# Patient Record
Sex: FEMALE | Race: BLACK OR AFRICAN AMERICAN | NOT HISPANIC OR LATINO | Employment: UNEMPLOYED | URBAN - METROPOLITAN AREA
[De-identification: names, ages, dates, MRNs, and addresses within clinical notes are randomized per-mention and may not be internally consistent; named-entity substitution may affect disease eponyms.]

---

## 2023-06-02 ENCOUNTER — HOSPITAL ENCOUNTER (OUTPATIENT)
Dept: DATA CONVERSION | Facility: HOSPITAL | Age: 23
End: 2023-06-02
Attending: EMERGENCY MEDICINE | Admitting: STUDENT IN AN ORGANIZED HEALTH CARE EDUCATION/TRAINING PROGRAM
Payer: COMMERCIAL

## 2023-06-02 DIAGNOSIS — R11.0 NAUSEA: ICD-10-CM

## 2023-06-02 DIAGNOSIS — R10.11 RIGHT UPPER QUADRANT PAIN: ICD-10-CM

## 2023-06-02 DIAGNOSIS — Z3A.01 LESS THAN 8 WEEKS GESTATION OF PREGNANCY (HHS-HCC): ICD-10-CM

## 2023-06-02 DIAGNOSIS — J45.909 UNSPECIFIED ASTHMA, UNCOMPLICATED (HHS-HCC): ICD-10-CM

## 2023-06-02 DIAGNOSIS — O00.90 UNSPECIFIED ECTOPIC PREGNANCY WITHOUT INTRAUTERINE PREGNANCY (HHS-HCC): ICD-10-CM

## 2023-06-02 DIAGNOSIS — R10.31 RIGHT LOWER QUADRANT PAIN: ICD-10-CM

## 2023-06-02 LAB
ABO GROUP (TYPE) IN BLOOD: NORMAL
ABO GROUP (TYPE) IN BLOOD: NORMAL
ACTIVATED PARTIAL THROMBOPLASTIN TIME IN PPP BY COAGULATION ASSAY: 26 SEC (ref 26–39)
ALANINE AMINOTRANSFERASE (SGPT) (U/L) IN SER/PLAS: 14 U/L (ref 7–45)
ALBUMIN (G/DL) IN SER/PLAS: 3.8 G/DL (ref 3.4–5)
ALKALINE PHOSPHATASE (U/L) IN SER/PLAS: 65 U/L (ref 33–110)
ANION GAP IN SER/PLAS: 13 MMOL/L (ref 10–20)
ANTIBODY SCREEN: NORMAL
ANTIBODY SCREEN: NORMAL
ASPARTATE AMINOTRANSFERASE (SGOT) (U/L) IN SER/PLAS: 13 U/L (ref 9–39)
BASOPHILS (10*3/UL) IN BLOOD BY AUTOMATED COUNT: 0.01 X10E9/L (ref 0–0.1)
BASOPHILS/100 LEUKOCYTES IN BLOOD BY AUTOMATED COUNT: 0.1 % (ref 0–2)
BILIRUBIN TOTAL (MG/DL) IN SER/PLAS: 0.4 MG/DL (ref 0–1.2)
CALCIUM (MG/DL) IN SER/PLAS: 9 MG/DL (ref 8.6–10.6)
CARBON DIOXIDE, TOTAL (MMOL/L) IN SER/PLAS: 23 MMOL/L (ref 21–32)
CHLORIDE (MMOL/L) IN SER/PLAS: 105 MMOL/L (ref 98–107)
CHORIOGONADOTROPIN (MIU/ML) IN SER/PLAS: ABNORMAL IU/L
CREATININE (MG/DL) IN SER/PLAS: 0.5 MG/DL (ref 0.5–1.05)
EOSINOPHILS (10*3/UL) IN BLOOD BY AUTOMATED COUNT: 0 X10E9/L (ref 0–0.7)
EOSINOPHILS/100 LEUKOCYTES IN BLOOD BY AUTOMATED COUNT: 0 % (ref 0–6)
ERYTHROCYTE DISTRIBUTION WIDTH (RATIO) BY AUTOMATED COUNT: 14 % (ref 11.5–14.5)
ERYTHROCYTE MEAN CORPUSCULAR HEMOGLOBIN CONCENTRATION (G/DL) BY AUTOMATED: 34.6 G/DL (ref 32–36)
ERYTHROCYTE MEAN CORPUSCULAR VOLUME (FL) BY AUTOMATED COUNT: 84 FL (ref 80–100)
ERYTHROCYTES (10*6/UL) IN BLOOD BY AUTOMATED COUNT: 3.39 X10E12/L (ref 4–5.2)
GFR FEMALE: >90 ML/MIN/1.73M2
GLUCOSE (MG/DL) IN SER/PLAS: 106 MG/DL (ref 74–99)
HEMATOCRIT (%) IN BLOOD BY AUTOMATED COUNT: 28.6 % (ref 36–46)
HEMOGLOBIN (G/DL) IN BLOOD: 9.9 G/DL (ref 12–16)
IMMATURE GRANULOCYTES/100 LEUKOCYTES IN BLOOD BY AUTOMATED COUNT: 0.7 % (ref 0–0.9)
INR IN PPP BY COAGULATION ASSAY: 1.1 (ref 0.9–1.1)
LEUKOCYTES (10*3/UL) IN BLOOD BY AUTOMATED COUNT: 11.2 X10E9/L (ref 4.4–11.3)
LYMPHOCYTES (10*3/UL) IN BLOOD BY AUTOMATED COUNT: 1.04 X10E9/L (ref 1.2–4.8)
LYMPHOCYTES/100 LEUKOCYTES IN BLOOD BY AUTOMATED COUNT: 9.3 % (ref 13–44)
MONOCYTES (10*3/UL) IN BLOOD BY AUTOMATED COUNT: 0.42 X10E9/L (ref 0.1–1)
MONOCYTES/100 LEUKOCYTES IN BLOOD BY AUTOMATED COUNT: 3.8 % (ref 2–10)
NEUTROPHILS (10*3/UL) IN BLOOD BY AUTOMATED COUNT: 9.64 X10E9/L (ref 1.2–7.7)
NEUTROPHILS/100 LEUKOCYTES IN BLOOD BY AUTOMATED COUNT: 86.1 % (ref 40–80)
NRBC (PER 100 WBCS) BY AUTOMATED COUNT: 0 /100 WBC (ref 0–0)
PLATELETS (10*3/UL) IN BLOOD AUTOMATED COUNT: 268 X10E9/L (ref 150–450)
POTASSIUM (MMOL/L) IN SER/PLAS: 3.3 MMOL/L (ref 3.5–5.3)
PROTEIN TOTAL: 6.9 G/DL (ref 6.4–8.2)
PROTHROMBIN TIME (PT) IN PPP BY COAGULATION ASSAY: 12.5 SEC (ref 9.8–13.4)
RH FACTOR: NORMAL
RH FACTOR: NORMAL
SODIUM (MMOL/L) IN SER/PLAS: 138 MMOL/L (ref 136–145)
UREA NITROGEN (MG/DL) IN SER/PLAS: 7 MG/DL (ref 6–23)

## 2023-06-05 LAB
COMPLETE PATHOLOGY REPORT: NORMAL
CONVERTED CLINICAL DIAGNOSIS-HISTORY: NORMAL
CONVERTED FINAL DIAGNOSIS: NORMAL
CONVERTED FINAL REPORT PDF LINK TO COPY AND PASTE: NORMAL
CONVERTED GROSS DESCRIPTION: NORMAL

## 2023-07-18 ENCOUNTER — APPOINTMENT (OUTPATIENT)
Dept: PRIMARY CARE | Facility: CLINIC | Age: 23
End: 2023-07-18
Payer: COMMERCIAL

## 2023-07-19 ENCOUNTER — TELEPHONE (OUTPATIENT)
Dept: PRIMARY CARE | Facility: CLINIC | Age: 23
End: 2023-07-19
Payer: COMMERCIAL

## 2023-07-19 NOTE — TELEPHONE ENCOUNTER
Patient was a no show yesterday for Олег and was RS 7/24/23 with Dr Bales ,  this was cancelled ad she wants to R/S.  I am not sure of status can you please call her>

## 2023-07-24 ENCOUNTER — APPOINTMENT (OUTPATIENT)
Dept: PRIMARY CARE | Facility: CLINIC | Age: 23
End: 2023-07-24
Payer: COMMERCIAL

## 2023-08-03 PROBLEM — J45.909 ASTHMA (HHS-HCC): Status: ACTIVE | Noted: 2023-08-03

## 2023-08-03 PROBLEM — F90.2 ATTENTION DEFICIT HYPERACTIVITY DISORDER (ADHD), COMBINED TYPE: Status: ACTIVE | Noted: 2017-02-07

## 2023-08-03 PROBLEM — A59.01 TRICHOMONAL VAGINITIS: Status: RESOLVED | Noted: 2017-09-28 | Resolved: 2023-08-03

## 2023-08-03 PROBLEM — E66.9 CLASS 1 OBESITY: Status: ACTIVE | Noted: 2023-08-03

## 2023-08-03 PROBLEM — G47.9 SLEEP DISTURBANCE: Status: ACTIVE | Noted: 2017-06-02

## 2023-08-03 PROBLEM — E66.811 CLASS 1 OBESITY: Status: ACTIVE | Noted: 2023-08-03

## 2023-08-03 PROBLEM — K08.9 CHRONIC DENTAL PAIN: Status: ACTIVE | Noted: 2017-09-26

## 2023-08-03 PROBLEM — F31.9 BIPOLAR AND RELATED DISORDER (MULTI): Status: ACTIVE | Noted: 2017-02-07

## 2023-08-03 PROBLEM — F32.A DEPRESSION: Status: ACTIVE | Noted: 2017-02-07

## 2023-08-03 PROBLEM — G89.29 CHRONIC DENTAL PAIN: Status: ACTIVE | Noted: 2017-09-26

## 2023-08-03 PROBLEM — L85.3 DRY SKIN DERMATITIS: Status: ACTIVE | Noted: 2017-05-12

## 2023-08-03 PROBLEM — L29.9 ITCHY SKIN: Status: RESOLVED | Noted: 2023-08-03 | Resolved: 2023-08-03

## 2023-08-03 PROBLEM — D56.3: Status: ACTIVE | Noted: 2023-08-03

## 2023-08-03 RX ORDER — DIAPER,BRIEF,INFANT-TODD,DISP
EACH MISCELLANEOUS
COMMUNITY
Start: 2018-10-26

## 2023-08-03 RX ORDER — LORATADINE 10 MG/1
1 TABLET ORAL DAILY
COMMUNITY
Start: 2018-10-26

## 2023-08-03 RX ORDER — ACETAMINOPHEN 500 MG
TABLET ORAL
COMMUNITY
Start: 2018-08-31

## 2023-08-03 RX ORDER — IBUPROFEN 600 MG/1
600 TABLET ORAL EVERY 6 HOURS PRN
COMMUNITY

## 2023-08-04 ENCOUNTER — APPOINTMENT (OUTPATIENT)
Dept: PRIMARY CARE | Facility: CLINIC | Age: 23
End: 2023-08-04
Payer: COMMERCIAL

## 2023-09-21 ENCOUNTER — APPOINTMENT (OUTPATIENT)
Dept: PRIMARY CARE | Facility: CLINIC | Age: 23
End: 2023-09-21
Payer: COMMERCIAL

## 2023-09-30 NOTE — H&P
History of Present Illness:   Pregnant/Lactating:  ·  Are You Pregnant yes (1)   ·  Are You Currently Breastfeeding no (1)     History Present Illness:  Reason for surgery: ectopic pregnancy   HPI:    23 yo  at 8-9wga by LMP presents from  with concern for ruptured ectopic. She reports she has been having right sided abdominal pain for 2-3 weeks, which has worsened  over the last 3-4 days. The pain is crampy and constant. She denies fevers/chills, N/V, CP, SOB.     ED course:  - hgb 9.9  - hcg 84371    Obhx: P1  - 2018  c/b PPH; 6#5 F  - IAB x1  GYN: h/o CT  PMHx: asthma  Meds: PNV, albuterol  NKDA  PSHx: D&C x1  SHx: t/a/d      Allergies:        Allergies:  ·  No Known Allergies :     Home Medication Review:   Home Medications Reviewed: yes     Impression/Procedure:   ·  Impression and Planned Procedure: dx lap, removal of ectopic pregnancy, possible salpingectomy       ERAS (Enhanced Recovery After Surgery):  ·  ERAS Patient: yes   ·  CPM/PAT Utilization: no   ·  Immunonutrition Recovery Drink Utilization: no   ·  Carbohydrate Supplement Drink Utilization: no       Vital Signs:  Temperature C: 36.2 degrees C   Temperature F: 97.1 degrees F   Heart Rate: 92 beats per minute   Respiratory Rate: 16 breath per minute   Blood Pressure Systolic: 138 mm/Hg   Blood Pressure Diastolic: 84 mm/Hg     Physical Exam by System:    Constitutional: awake and alert, NAD   Eyes: clear sclera   ENMT: MMM   Respiratory/Thorax: no respiratory distress   Cardiovascular: Regular rate   Gastrointestinal: Soft, non-distended, slightly tender  to palpation in RLQ, no rebound/guarding   Genitourinary: def   Extremities: normal extremities, no cyanosis or edema   Neurological: alert and oriented x3   Psychological: Appropriate mood and behavior   Skin: Warm and dry     Consent:   COVID-19 Consent:  ·  COVID-19 Risk Consent Surgeon has reviewed key risks related to the risk of constance COVID-19 and if they contract  "COVID-19 what the risks are.     Attestation:   Note Completion:  I am a:  Resident/Fellow   Attending Attestation I saw and evaluated the patient.  I personally obtained the key and critical portions of the history and physical exam or was physically present for key and  critical portions performed by the resident/fellow. I reviewed the resident/fellow?s documentation and discussed the patient with the resident/fellow.  I agree with the resident/fellow?s medical decision making as documented in their note  with the exception/addition of the following:    I personally evaluated the patient on 02-Jun-2023   Comments/ Additional Findings    Ultrasound shows right adnexal ectopic pregnancy and large amount of free fluid.   Patient to go to OR for likely ruptured ectopic.           Electronic Signatures:  Lindsay Jiang (MD)  (Signed 02-Jun-2023 17:17)   Authored: Note Completion  Chayo Solares (Resident))  (Signed 02-Jun-2023 16:49)   Authored: History of Present Illness, Allergies, Home  Medication Review, Impression/Procedure, ERAS, Physical Exam, Consent, Note Completion      Last Updated: 02-Jun-2023 17:17 by Lindsay Jiang)    References:  1.  Data Referenced From \"Triage - ED\" 02-Jun-2023 13:55   "

## 2023-10-02 NOTE — OP NOTE
Post Operative Note:     PreOp Diagnosis: ectopic pregnancy   Post-Procedure Diagnosis: ruptured right tubal ectopic  pregnancy, hemoperitoneum   Procedure: 1. diagnostic laparoscopy  2. right salpingectomy  3. evacuation of hemoperitoneum   Surgeon: Dr Rashid Wells   Resident/Fellow/Other Assistant: Dr Ivy England   Anesthesia: GETA   Estimated Blood Loss (mL): 50   Specimen: yes. right fallopian tube, ectopic pregnancy   Complications: none   Findings: 1500cc hemoperitoneum, actively bleeding  ruptured right fallopian tube with gestational sac and approx 9wk fetus   Patient Returned To/Condition: PACU/good   Urine Output: 150cc     Operative Report Dictated:  Dictation: not applicable - note contains Operative  Report   Operative Report:    Findings: Upon abdominal entry, significant hemoperitoneum noted. Right fallopian tube ruptured with obvious  gestational sac in the tube. Briskly active bleeding was noted from the isthmal region of the tube. A normal R ovary, uterus, and contralateral fallopian tube & ovary were noted.     Procedure:  Patient was taken to the operating room where a pre-procedure time-out was performed and general anesthesia was administered. Patient was placed in dorsal lithotomy position with legs in sequential compression devices and Derrek stirrups. Right and left  arms were tucked along her sides. The patient was prepped and draped in the usual sterile fashion, after which a Osuna catheter was placed.  An incision was made at the base of the umbilicus with the scalpel approximately 1 cm in length.  This was developed  bluntly down to the underlying fascia. The fascia was elevated with small Kocher clamps and nicked with a knife. The underlying peritoneum was tented, nicked with the knife, and the peritoneal cavity entry site noted to be free of adhesions. A 10 mm balloon  port was placed and a pneumoperitoneum was assessed. The pelvis was easily visualized with findings as noted  above. It was possible to place two 5 mm port sites bilaterally in the right and left lower abdomen under direct visualization.  She was placed  into Trendelenburg position. A LigaSure device was used along the right mesosalpinx to dissect toward the cornua of the uterus. Tube and associated products of conception were then transected and removed via EndoCatch bag. The specimen was handed off  for pathology.  The bleeding portion of the mesosalpinx was cauterized with the LigaSure and hemostasis was achieved. Hemoperitoneum was evacuated. Hemostasis was again noted. The 5 mm port sites were removed from the abdomen. Pneumoperitoneum was desufflated.  The midline port site was removed, and the fascia closed with 0 Vicryl suture. The skin was closed with 3-0 Moncryl suture. Osuna catheter was removed. She was awakened from anesthesia and transferred to the recovery room in satisfactory condition.       Ivy England MD MPH (PGY-3)  Gynecology    Attestation:   Note Completion:  I am a: Resident/Fellow   Attending Attestation I was present for the entire procedure          Electronic Signatures:  Rashid Wells (MD (Fellow))  (Signed 04-Jun-2023 10:51)   Authored: Note Completion   Co-Signer: Post Operative Note, Note Completion  Ivy England (MD (Resident))  (Signed 02-Jun-2023 20:00)   Authored: Post Operative Note, Note Completion      Last Updated: 04-Jun-2023 10:51 by Rashid Wells (MD (Fellow))

## 2023-11-08 ENCOUNTER — HOSPITAL ENCOUNTER (EMERGENCY)
Facility: HOSPITAL | Age: 23
Discharge: HOME | End: 2023-11-08
Payer: COMMERCIAL

## 2023-11-08 ENCOUNTER — PHARMACY VISIT (OUTPATIENT)
Dept: PHARMACY | Facility: CLINIC | Age: 23
End: 2023-11-08
Payer: MEDICAID

## 2023-11-08 VITALS
DIASTOLIC BLOOD PRESSURE: 79 MMHG | HEART RATE: 87 BPM | HEIGHT: 64 IN | WEIGHT: 200 LBS | RESPIRATION RATE: 20 BRPM | BODY MASS INDEX: 34.15 KG/M2 | TEMPERATURE: 97.8 F | OXYGEN SATURATION: 99 % | SYSTOLIC BLOOD PRESSURE: 124 MMHG

## 2023-11-08 DIAGNOSIS — A59.01 TRICHOMONAS VAGINITIS: Primary | ICD-10-CM

## 2023-11-08 LAB
AMORPH CRY #/AREA UR COMP ASSIST: ABNORMAL /HPF
APPEARANCE UR: ABNORMAL
BACTERIA #/AREA URNS AUTO: ABNORMAL /HPF
BILIRUB UR STRIP.AUTO-MCNC: NEGATIVE MG/DL
CLUE CELLS SPEC QL WET PREP: ABNORMAL
COLOR UR: ABNORMAL
GLUCOSE UR STRIP.AUTO-MCNC: NEGATIVE MG/DL
HCG UR QL IA.RAPID: NEGATIVE
HOLD SPECIMEN: NORMAL
KETONES UR STRIP.AUTO-MCNC: ABNORMAL MG/DL
LEUKOCYTE ESTERASE UR QL STRIP.AUTO: ABNORMAL
NITRITE UR QL STRIP.AUTO: NEGATIVE
PH UR STRIP.AUTO: 5 [PH]
PROT UR STRIP.AUTO-MCNC: ABNORMAL MG/DL
RBC # UR STRIP.AUTO: NEGATIVE /UL
RBC #/AREA URNS AUTO: ABNORMAL /HPF
SP GR UR STRIP.AUTO: 1.03
T VAGINALIS SPEC QL WET PREP: PRESENT
UROBILINOGEN UR STRIP.AUTO-MCNC: 2 MG/DL
WBC #/AREA URNS AUTO: ABNORMAL /HPF
WBC VAG QL WET PREP: ABNORMAL
YEAST VAG QL WET PREP: ABNORMAL

## 2023-11-08 PROCEDURE — 99285 EMERGENCY DEPT VISIT HI MDM: CPT

## 2023-11-08 PROCEDURE — 99283 EMERGENCY DEPT VISIT LOW MDM: CPT

## 2023-11-08 PROCEDURE — 87086 URINE CULTURE/COLONY COUNT: CPT | Mod: PORLAB | Performed by: NURSE PRACTITIONER

## 2023-11-08 PROCEDURE — 87800 DETECT AGNT MULT DNA DIREC: CPT | Mod: PORLAB | Performed by: NURSE PRACTITIONER

## 2023-11-08 PROCEDURE — 81025 URINE PREGNANCY TEST: CPT | Performed by: NURSE PRACTITIONER

## 2023-11-08 PROCEDURE — 81001 URINALYSIS AUTO W/SCOPE: CPT | Performed by: NURSE PRACTITIONER

## 2023-11-08 PROCEDURE — 87210 SMEAR WET MOUNT SALINE/INK: CPT | Performed by: NURSE PRACTITIONER

## 2023-11-08 PROCEDURE — RXMED WILLOW AMBULATORY MEDICATION CHARGE

## 2023-11-08 RX ORDER — METRONIDAZOLE 500 MG/1
500 TABLET ORAL 2 TIMES DAILY
Qty: 14 TABLET | Refills: 0 | Status: SHIPPED | OUTPATIENT
Start: 2023-11-08 | End: 2023-11-15

## 2023-11-08 ASSESSMENT — COLUMBIA-SUICIDE SEVERITY RATING SCALE - C-SSRS
1. IN THE PAST MONTH, HAVE YOU WISHED YOU WERE DEAD OR WISHED YOU COULD GO TO SLEEP AND NOT WAKE UP?: NO
6. HAVE YOU EVER DONE ANYTHING, STARTED TO DO ANYTHING, OR PREPARED TO DO ANYTHING TO END YOUR LIFE?: NO
2. HAVE YOU ACTUALLY HAD ANY THOUGHTS OF KILLING YOURSELF?: NO

## 2023-11-08 ASSESSMENT — PAIN SCALES - GENERAL
PAINLEVEL_OUTOF10: 0 - NO PAIN
PAINLEVEL_OUTOF10: 0 - NO PAIN

## 2023-11-08 ASSESSMENT — LIFESTYLE VARIABLES
REASON UNABLE TO ASSESS: NO
EVER FELT BAD OR GUILTY ABOUT YOUR DRINKING: NO
EVER HAD A DRINK FIRST THING IN THE MORNING TO STEADY YOUR NERVES TO GET RID OF A HANGOVER: NO
HAVE PEOPLE ANNOYED YOU BY CRITICIZING YOUR DRINKING: NO
HAVE YOU EVER FELT YOU SHOULD CUT DOWN ON YOUR DRINKING: NO

## 2023-11-08 ASSESSMENT — PAIN - FUNCTIONAL ASSESSMENT
PAIN_FUNCTIONAL_ASSESSMENT: 0-10
PAIN_FUNCTIONAL_ASSESSMENT: 0-10

## 2023-11-08 NOTE — PROGRESS NOTES
Medication Education     Medication education for Nguyen Nichols was provided to the patient  for the following medication(s):    Metronidazole 500 mg by mouth twice daily for 7 days    Medication education provided by a Pharmacist:  ADR Counseling Dose, frequency, storage    Identified potential barriers to education:  None    Method(s) of Education:  Verbal    An opportunity to ask questions and receive answers was provided.     Assessment of understanding the patient :  2= meets goals/outcomes    Additional Notes: Patient reported that last time she was here for STD treatment, she broke out in hives but wasn't sure what antibiotic caused it. I did confirm that she did not have any shortness of breath, throat swelling, and chest pain. Appears she has tolerated this before.    Rosalba Leonard   PharmD Candidate 2024

## 2023-11-08 NOTE — ED PROVIDER NOTES
HPI   Chief Complaint   Patient presents with    Vaginal Discharge    Vaginal Itching    Abscess       This is a 22-year-old -American female presenting to the emergency room with complaints of vaginal itching and discharge.  It has been going on for the last 3 to 4 days.  The patient denies any foul odor.  She denies any dyspareunia.  She reports she is having discomfort with urination.  She maintains that she has had the same sexual partner who has been tested.  She has had bacterial vaginosis in the past.  She denies any concern for STD.  She is not having any abdominal or back pain.  She denies any fevers, chills, nausea, or vomiting.                          No data recorded                Patient History   Past Medical History:   Diagnosis Date    Abnormal tooth eruption 11/18/2016    Encounter for supervision of normal first pregnancy, third trimester 11/12/2018    First pregnancy, third trimester    Inadequate pain control 11/18/2016    Obesity 06/13/2015    Pruritus, unspecified 10/26/2018    Itchy skin    Trichomonal vaginitis 09/28/2017     No past surgical history on file.  No family history on file.  Social History     Tobacco Use    Smoking status: Not on file    Smokeless tobacco: Not on file   Substance Use Topics    Alcohol use: Not on file    Drug use: Not on file       Physical Exam   ED Triage Vitals [11/08/23 1206]   Temp Heart Rate Resp BP   36.6 °C (97.8 °F) 87 20 124/79      SpO2 Temp src Heart Rate Source Patient Position   99 % -- -- --      BP Location FiO2 (%)     -- --       Physical Exam  Vitals and nursing note reviewed.   Constitutional:       Appearance: Normal appearance.   HENT:      Head: Normocephalic and atraumatic.      Right Ear: External ear normal.      Left Ear: External ear normal.      Nose: Nose normal.      Mouth/Throat:      Pharynx: Oropharynx is clear.   Eyes:      Conjunctiva/sclera: Conjunctivae normal.      Pupils: Pupils are equal, round, and reactive to  light.   Cardiovascular:      Rate and Rhythm: Normal rate and regular rhythm.   Pulmonary:      Effort: Pulmonary effort is normal.      Breath sounds: Normal breath sounds.   Abdominal:      General: Bowel sounds are normal.      Palpations: Abdomen is soft.   Genitourinary:     Comments: The patient has multiple scars consistent with hidradenitis suppurativa noted to the bilateral inner thighs.  The patient has a scant amount of thin white discharge noted to the vaginal vault.  The cervix appears normal.  There is no cervical wall or adnexal tenderness with palpation.  Musculoskeletal:         General: Normal range of motion.   Skin:     General: Skin is warm and dry.   Neurological:      General: No focal deficit present.      Mental Status: She is alert.   Psychiatric:         Mood and Affect: Mood normal.         Behavior: Behavior normal.         ED Course & MDM   Diagnoses as of 11/08/23 1501   Trichomonas vaginitis       Medical Decision Making  Patient was seen and evaluated by the nurse practitioner, Gale Wagner.  The patient is presenting to the emergency room with concerns for vaginal discharge and itching.  Differential diagnosis includes candidiasis, bacterial vaginosis, or STD.  Urine sample was obtained was negative for acute infection.  A pelvic exam was performed and the examination was largely unremarkable except thin white discharge noted in the vaginal vault.  Wet prep and GC and Chlamydia testing was performed.  GC and Chlamydia testing results are pending at the time of dictation.  Her wet prep was positive for trichomonas.  The patient was notified of her laboratory results.  The patient is to be prescribed metronidazole for antibiotic coverage.  She is to refrain from sexual activity until cleared and retested by follow-up physician after completion of her antibiotics.  It was also advised that her partner be tested as well.  The patient was discharged in stable condition with computer  discharge instructions given.  She is to return if worse in any way.        Procedure  Procedures     Gale Wagner, APRN-CNP  11/08/23 3951

## 2023-11-09 LAB
C TRACH RRNA SPEC QL NAA+PROBE: POSITIVE
N GONORRHOEA DNA SPEC QL PROBE+SIG AMP: NEGATIVE

## 2023-11-10 ENCOUNTER — TELEPHONE (OUTPATIENT)
Dept: PHARMACY | Facility: HOSPITAL | Age: 23
End: 2023-11-10
Payer: COMMERCIAL

## 2023-11-10 DIAGNOSIS — A74.9 CHLAMYDIA: Primary | ICD-10-CM

## 2023-11-10 LAB — BACTERIA UR CULT: NORMAL

## 2023-11-10 RX ORDER — DOXYCYCLINE 100 MG/1
100 CAPSULE ORAL 2 TIMES DAILY
Qty: 14 CAPSULE | Refills: 0 | Status: SHIPPED | OUTPATIENT
Start: 2023-11-10 | End: 2023-11-17

## 2023-11-10 NOTE — PROGRESS NOTES
EDPD Note: Bug-Drug Mismatch    Contacted Ms. Nguyen Nichols regarding a positive chlamydia result that was taken during their recent emergency room visit. I completed education with patient. The patient is not being treated. She is aware of the trichomonas and is currently taking metronidazole 500mg BID x 7 days. She is aware to take the antibiotic below and to continue the Flagyl.    The following prescription was sent to the patient's preferred pharmacy. No further follow up needed from EDPD Team.   Drug: doxycycline 100mg  Si tab PO BID  Days Supply: 7 days     Latest Reference Range & Units 23 13:02   Chlamydia trachomatis, Amplified Negative  Positive !   !: Data is abnormal   Latest Reference Range & Units 23 13:02   Trichomonas None Seen  Present !   !: Data is abnormal    Jemima Lara, PharmD

## 2024-07-30 ENCOUNTER — HOSPITAL ENCOUNTER (EMERGENCY)
Facility: HOSPITAL | Age: 24
Discharge: HOME | End: 2024-07-30
Attending: STUDENT IN AN ORGANIZED HEALTH CARE EDUCATION/TRAINING PROGRAM
Payer: COMMERCIAL

## 2024-07-30 ENCOUNTER — APPOINTMENT (OUTPATIENT)
Dept: RADIOLOGY | Facility: HOSPITAL | Age: 24
End: 2024-07-30
Payer: COMMERCIAL

## 2024-07-30 VITALS
WEIGHT: 202 LBS | HEART RATE: 71 BPM | SYSTOLIC BLOOD PRESSURE: 139 MMHG | TEMPERATURE: 98.1 F | DIASTOLIC BLOOD PRESSURE: 89 MMHG | HEIGHT: 63 IN | OXYGEN SATURATION: 99 % | BODY MASS INDEX: 35.79 KG/M2 | RESPIRATION RATE: 16 BRPM

## 2024-07-30 DIAGNOSIS — N93.9 VAGINAL BLEEDING: Primary | ICD-10-CM

## 2024-07-30 DIAGNOSIS — O36.80X0 PREGNANCY OF UNKNOWN ANATOMIC LOCATION (HHS-HCC): ICD-10-CM

## 2024-07-30 LAB
ALBUMIN SERPL BCP-MCNC: 4.1 G/DL (ref 3.4–5)
ALP SERPL-CCNC: 65 U/L (ref 33–110)
ALT SERPL W P-5'-P-CCNC: 7 U/L (ref 7–45)
ANION GAP SERPL CALC-SCNC: 14 MMOL/L (ref 10–20)
APPEARANCE UR: ABNORMAL
AST SERPL W P-5'-P-CCNC: 18 U/L (ref 9–39)
B-HCG SERPL-ACNC: 47 MIU/ML
BASOPHILS # BLD AUTO: 0.04 X10*3/UL (ref 0–0.1)
BASOPHILS NFR BLD AUTO: 0.9 %
BILIRUB SERPL-MCNC: 0.4 MG/DL (ref 0–1.2)
BILIRUB UR STRIP.AUTO-MCNC: NEGATIVE MG/DL
BUN SERPL-MCNC: 10 MG/DL (ref 6–23)
C TRACH RRNA SPEC QL NAA+PROBE: NEGATIVE
CALCIUM SERPL-MCNC: 9.1 MG/DL (ref 8.6–10.6)
CHLORIDE SERPL-SCNC: 106 MMOL/L (ref 98–107)
CLUE CELLS SPEC QL WET PREP: NORMAL
CO2 SERPL-SCNC: 21 MMOL/L (ref 21–32)
COLOR UR: YELLOW
CREAT SERPL-MCNC: 0.7 MG/DL (ref 0.5–1.05)
EGFRCR SERPLBLD CKD-EPI 2021: >90 ML/MIN/1.73M*2
EOSINOPHIL # BLD AUTO: 0.04 X10*3/UL (ref 0–0.7)
EOSINOPHIL NFR BLD AUTO: 0.9 %
ERYTHROCYTE [DISTWIDTH] IN BLOOD BY AUTOMATED COUNT: 14.8 % (ref 11.5–14.5)
GLUCOSE SERPL-MCNC: 83 MG/DL (ref 74–99)
GLUCOSE UR STRIP.AUTO-MCNC: NORMAL MG/DL
HCT VFR BLD AUTO: 40.4 % (ref 36–46)
HGB BLD-MCNC: 13 G/DL (ref 12–16)
HIV 1+2 AB+HIV1 P24 AG SERPL QL IA: NONREACTIVE
IMM GRANULOCYTES # BLD AUTO: 0.02 X10*3/UL (ref 0–0.7)
IMM GRANULOCYTES NFR BLD AUTO: 0.5 % (ref 0–0.9)
KETONES UR STRIP.AUTO-MCNC: NEGATIVE MG/DL
LEUKOCYTE ESTERASE UR QL STRIP.AUTO: NEGATIVE
LYMPHOCYTES # BLD AUTO: 1.7 X10*3/UL (ref 1.2–4.8)
LYMPHOCYTES NFR BLD AUTO: 39.1 %
MCH RBC QN AUTO: 29 PG (ref 26–34)
MCHC RBC AUTO-ENTMCNC: 32.2 G/DL (ref 32–36)
MCV RBC AUTO: 90 FL (ref 80–100)
MONOCYTES # BLD AUTO: 0.33 X10*3/UL (ref 0.1–1)
MONOCYTES NFR BLD AUTO: 7.6 %
MUCOUS THREADS #/AREA URNS AUTO: ABNORMAL /LPF
N GONORRHOEA DNA SPEC QL PROBE+SIG AMP: NEGATIVE
NEUTROPHILS # BLD AUTO: 2.22 X10*3/UL (ref 1.2–7.7)
NEUTROPHILS NFR BLD AUTO: 51 %
NITRITE UR QL STRIP.AUTO: NEGATIVE
NRBC BLD-RTO: 0 /100 WBCS (ref 0–0)
PH UR STRIP.AUTO: 6 [PH]
PLATELET # BLD AUTO: 287 X10*3/UL (ref 150–450)
POTASSIUM SERPL-SCNC: 3.7 MMOL/L (ref 3.5–5.3)
PREGNANCY TEST URINE, POC: POSITIVE
PROT SERPL-MCNC: 7.5 G/DL (ref 6.4–8.2)
PROT UR STRIP.AUTO-MCNC: ABNORMAL MG/DL
RBC # BLD AUTO: 4.48 X10*6/UL (ref 4–5.2)
RBC # UR STRIP.AUTO: ABNORMAL /UL
RBC #/AREA URNS AUTO: >20 /HPF
SODIUM SERPL-SCNC: 137 MMOL/L (ref 136–145)
SP GR UR STRIP.AUTO: 1.03
SQUAMOUS #/AREA URNS AUTO: ABNORMAL /HPF
T VAGINALIS SPEC QL WET PREP: NORMAL
TREPONEMA PALLIDUM IGG+IGM AB [PRESENCE] IN SERUM OR PLASMA BY IMMUNOASSAY: NONREACTIVE
TRICHOMONAS REFLEX COMMENT: NORMAL
UROBILINOGEN UR STRIP.AUTO-MCNC: ABNORMAL MG/DL
WBC # BLD AUTO: 4.4 X10*3/UL (ref 4.4–11.3)
WBC #/AREA URNS AUTO: ABNORMAL /HPF
WBC VAG QL WET PREP: NORMAL
YEAST VAG QL WET PREP: NORMAL

## 2024-07-30 PROCEDURE — 87389 HIV-1 AG W/HIV-1&-2 AB AG IA: CPT

## 2024-07-30 PROCEDURE — 81001 URINALYSIS AUTO W/SCOPE: CPT

## 2024-07-30 PROCEDURE — 76830 TRANSVAGINAL US NON-OB: CPT

## 2024-07-30 PROCEDURE — 87661 TRICHOMONAS VAGINALIS AMPLIF: CPT

## 2024-07-30 PROCEDURE — 87210 SMEAR WET MOUNT SALINE/INK: CPT

## 2024-07-30 PROCEDURE — 36415 COLL VENOUS BLD VENIPUNCTURE: CPT

## 2024-07-30 PROCEDURE — 85025 COMPLETE CBC W/AUTO DIFF WBC: CPT

## 2024-07-30 PROCEDURE — 86780 TREPONEMA PALLIDUM: CPT

## 2024-07-30 PROCEDURE — 81025 URINE PREGNANCY TEST: CPT

## 2024-07-30 PROCEDURE — 84702 CHORIONIC GONADOTROPIN TEST: CPT

## 2024-07-30 PROCEDURE — 87491 CHLMYD TRACH DNA AMP PROBE: CPT

## 2024-07-30 PROCEDURE — 76817 TRANSVAGINAL US OBSTETRIC: CPT | Performed by: RADIOLOGY

## 2024-07-30 PROCEDURE — 99284 EMERGENCY DEPT VISIT MOD MDM: CPT | Mod: 25

## 2024-07-30 PROCEDURE — 80053 COMPREHEN METABOLIC PANEL: CPT

## 2024-07-30 PROCEDURE — 76815 OB US LIMITED FETUS(S): CPT | Performed by: RADIOLOGY

## 2024-07-30 ASSESSMENT — LIFESTYLE VARIABLES
TOTAL SCORE: 0
EVER HAD A DRINK FIRST THING IN THE MORNING TO STEADY YOUR NERVES TO GET RID OF A HANGOVER: NO
HAVE PEOPLE ANNOYED YOU BY CRITICIZING YOUR DRINKING: NO
EVER FELT BAD OR GUILTY ABOUT YOUR DRINKING: NO
HAVE YOU EVER FELT YOU SHOULD CUT DOWN ON YOUR DRINKING: NO

## 2024-07-30 ASSESSMENT — COLUMBIA-SUICIDE SEVERITY RATING SCALE - C-SSRS
2. HAVE YOU ACTUALLY HAD ANY THOUGHTS OF KILLING YOURSELF?: NO
6. HAVE YOU EVER DONE ANYTHING, STARTED TO DO ANYTHING, OR PREPARED TO DO ANYTHING TO END YOUR LIFE?: NO
1. IN THE PAST MONTH, HAVE YOU WISHED YOU WERE DEAD OR WISHED YOU COULD GO TO SLEEP AND NOT WAKE UP?: NO

## 2024-07-30 ASSESSMENT — PAIN - FUNCTIONAL ASSESSMENT: PAIN_FUNCTIONAL_ASSESSMENT: 0-10

## 2024-07-30 ASSESSMENT — PAIN SCALES - GENERAL: PAINLEVEL_OUTOF10: 0 - NO PAIN

## 2024-07-30 NOTE — ED PROVIDER NOTES
History of Present Illness   Information Gathering: History is collected from the patient and chart review for past medical history    HPI:  Nguyen Nichols is a 23 y.o. female patient is a G4, P1 female at approximately 4 weeks gestational age based off of LMP (June 23) presenting to the emergency department for concerns of miscarriage. Patient states that yesterday she took a pregnancy test with which resulted as positive. Later on, she experienced abdominal cramping and persistent bleeding consistent with prior menstruation. She states that she is still bleeding today and the volume of blood is consistent with previous periods. Denies lightheadedness, dizziness, loss of consciousness. Denies fevers. Denies urinary symptoms such as dysuria or polyuria.     Physical Exam   Triage vitals:  T 36.7 °C (98 °F)  HR 71  /89  RR 16  O2 99 %      Physical Exam  Constitutional:       Appearance: Normal appearance.   HENT:      Head: Normocephalic and atraumatic.   Eyes:      Extraocular Movements: Extraocular movements intact.      Pupils: Pupils are equal, round, and reactive to light.   Cardiovascular:      Rate and Rhythm: Normal rate and regular rhythm.   Pulmonary:      Effort: Pulmonary effort is normal.      Breath sounds: Normal breath sounds.   Abdominal:      General: Abdomen is flat.      Palpations: Abdomen is soft.   Musculoskeletal:         General: No swelling or signs of injury. Normal range of motion.   Neurological:      General: No focal deficit present.      Mental Status: She is alert and oriented to person, place, and time.         Medical Decision Making & ED Course   Medical Decision Making:  Nguyen Nichols is a 23 y.o. female patient is a G4, P1 female at approximately 4 weeks gestational age based off of LMP (June 23) presenting to the emergency department for concerns of miscarriage. On exam, patient is hemodynamically stable and afebrile. Physical exam unremarkable and pelvic exam  "shows mild bleeding coming from cervical os which is closed. Point-of-care pregnancy is positive. Point-of-care ultrasound at bedside shows no evidence of intrauterine pregnancy or fluid. Given concern for possible ectopic, patient sent to transvaginal ultrasound which shows no evidence of intrauterine pregnancy either. As result, STD swabs, CBC CMP and beta-hCG all collected. CBC is stable. Urinalysis negative. As result of workup, patient likely experiencing miscarriage but cannot rule out ectopic pregnancy at this time. As result, obstetrics was notified of patient's presence in the emergency department and requested follow-up for patient to be placed in \"Beta book\". Phone numbers were verified in the chart and patient signed out to oncoming provider with plan to await final hCG reading before sending patient home.    ED Course:  ED Course as of 07/31/24 2154 Tue Jul 30, 2024   1644 HCG, Beta-Quantitative(!): 47  Discussed with gynecology, patient is okay for discharge and will have a repeat beta-hCG in 2 days.  Confirmed patient's phone number [phone], per patient is okay to leave message if needed. [HH]      ED Course User Index  [HH] Génesis Stout MD         Diagnoses as of 07/31/24 2154   Vaginal bleeding       ----      EKG Independent Interpretation: EKG interpreted by myself. Please see ED Course for full interpretation.    Independent Result Review and Interpretation: Relevant laboratory and radiographic results were reviewed and independently interpreted by myself.  As necessary, they are commented on in the ED Course.    Chronic conditions affecting the patient's care: As documented above in MDM    The patient was discussed with the following consultants/services: As described in MDM      Disposition   Patient was signed out to Girish at 1900 pending completion of their work-up.  Please see the next provider's transition of care note for the remainder of the patient's care.     Procedures "   Procedures    Patient seen and discussed with ED attending physician.    Tang Bruce MD  Emergency Medicine, PGY-2      Rico Bruce MD  Resident  07/31/24 4774

## 2024-07-30 NOTE — PROGRESS NOTES
Emergency Department Transition of Care Note       Signout   I received Nguyen Nichols in signout from Dr. Bruce.  Please see the ED Provider Note for all HPI, PE and MDM up to the time of signout at 1500.  This is in addition to the primary record.    In brief Nguyen Nichols is an 23 y.o. female , approximately 4wga by LMP () who presents to the emergency department with concern for miscarriage due to vaginal bleeding and abdominal cramping.  She was noted to have a positive urine pregnancy test, however transabdominal ultrasound was unable to identify IUP.    At the time of signout we were awaiting:  TVUS read, serum hCG quant, gynecology recs    ED Course & Medical Decision Making   Medical Decision Making:  No IUP or ectopic identified on TVUS.  Serum hCG quant 47.  Discussed with gynecology, patient placed in beta book and will have repeat beta-hCG in 2 days.  Confirmed that her phone number in the chart is correct.    Reviewed strict return precautions with the patient including worsening pain, nausea/vomiting, lightheadedness/dizziness or any new concerns.  Patient is aware that she can present to any  lab facility for her repeat beta quant in 2 days.  She was discharged home in stable condition.    ED Course:  ED Course as of 24e 2024   1644 HCG, Beta-Quantitative(!): 47  Discussed with gynecology, patient is okay for discharge and will have a repeat beta-hCG in 2 days.  Confirmed patient's phone number [phone], per patient is okay to leave message if needed. [HH]      ED Course User Index  [HH] Génesis Stout MD         Diagnoses as of 24 2354   Vaginal bleeding       Disposition   As a result of the work-up, the patient was discharged home.  she was informed of her diagnosis and instructed to come back with any concerns or worsening of condition.  she and was agreeable to the plan as discussed above.  she was given the opportunity to ask questions.  All of  the patient's questions were answered.    Patient seen and discussed with ED attending physician.    Génesis Stout MD  Emergency Medicine

## 2024-07-31 LAB — HOLD SPECIMEN: NORMAL

## 2024-08-01 LAB — T VAGINALIS RRNA SPEC QL NAA+PROBE: NEGATIVE

## 2024-08-02 ENCOUNTER — LAB (OUTPATIENT)
Dept: LAB | Facility: LAB | Age: 24
End: 2024-08-02
Payer: COMMERCIAL

## 2024-08-02 DIAGNOSIS — O36.80X0 PREGNANCY OF UNKNOWN ANATOMIC LOCATION (HHS-HCC): ICD-10-CM

## 2024-08-02 LAB — B-HCG SERPL-ACNC: 15 MIU/ML

## 2024-08-02 PROCEDURE — 84702 CHORIONIC GONADOTROPIN TEST: CPT

## 2024-08-02 PROCEDURE — 36415 COLL VENOUS BLD VENIPUNCTURE: CPT

## 2024-10-01 ENCOUNTER — HOSPITAL ENCOUNTER (EMERGENCY)
Facility: HOSPITAL | Age: 24
Discharge: HOME | End: 2024-10-01
Payer: COMMERCIAL

## 2024-10-01 VITALS
OXYGEN SATURATION: 99 % | TEMPERATURE: 97.2 F | HEIGHT: 63 IN | BODY MASS INDEX: 36.14 KG/M2 | RESPIRATION RATE: 18 BRPM | WEIGHT: 204 LBS | HEART RATE: 75 BPM | SYSTOLIC BLOOD PRESSURE: 142 MMHG | DIASTOLIC BLOOD PRESSURE: 99 MMHG

## 2024-10-01 DIAGNOSIS — M76.61 ACHILLES TENDINITIS OF RIGHT LOWER EXTREMITY: Primary | ICD-10-CM

## 2024-10-01 PROCEDURE — 2500000001 HC RX 250 WO HCPCS SELF ADMINISTERED DRUGS (ALT 637 FOR MEDICARE OP): Performed by: SURGERY

## 2024-10-01 PROCEDURE — 99282 EMERGENCY DEPT VISIT SF MDM: CPT | Performed by: SURGERY

## 2024-10-01 RX ORDER — IBUPROFEN 400 MG/1
800 TABLET ORAL ONCE
Status: COMPLETED | OUTPATIENT
Start: 2024-10-01 | End: 2024-10-01

## 2024-10-01 ASSESSMENT — PAIN DESCRIPTION - PROGRESSION: CLINICAL_PROGRESSION: NOT CHANGED

## 2024-10-01 ASSESSMENT — PAIN DESCRIPTION - LOCATION: LOCATION: ANKLE

## 2024-10-01 ASSESSMENT — PAIN - FUNCTIONAL ASSESSMENT: PAIN_FUNCTIONAL_ASSESSMENT: 0-10

## 2024-10-01 ASSESSMENT — PAIN SCALES - GENERAL: PAINLEVEL_OUTOF10: 9

## 2024-10-01 ASSESSMENT — PAIN DESCRIPTION - FREQUENCY: FREQUENCY: CONSTANT/CONTINUOUS

## 2024-10-01 ASSESSMENT — PAIN DESCRIPTION - DESCRIPTORS
DESCRIPTORS: ACHING
DESCRIPTORS: ACHING

## 2024-10-01 ASSESSMENT — PAIN DESCRIPTION - PAIN TYPE: TYPE: ACUTE PAIN

## 2024-10-01 ASSESSMENT — PAIN DESCRIPTION - ORIENTATION: ORIENTATION: RIGHT

## 2024-10-01 ASSESSMENT — PAIN DESCRIPTION - ONSET: ONSET: AWAKENED FROM SLEEP

## 2024-10-01 NOTE — ED PROVIDER NOTES
Chief Complaint   Patient presents with    Ankle Pain     HPI:   Nguyen Nichols is an 23 y.o. female presenting to the ED for chief complaint of right ankle pain.  She denies injury.  She explains that she developed the pain when she woke this morning.  The pain is localized to her posterior right heel.  Pain is worse with weightbearing and alleviated with rest.  Pain is nonradiating.  No numbness or tingling.    Allergies   Allergen Reactions    Latex Other   :  Past Medical History:   Diagnosis Date    Abnormal tooth eruption 11/18/2016    Encounter for supervision of normal first pregnancy, third trimester ([facility]) 11/12/2018    First pregnancy, third trimester    Inadequate pain control 11/18/2016    Obesity 06/13/2015    Pruritus, unspecified 10/26/2018    Itchy skin    Trichomonal vaginitis 09/28/2017     No past surgical history on file.  No family history on file.     Physical Exam  Vitals and nursing note reviewed.   Constitutional:       General: She is not in acute distress.     Appearance: She is well-developed.   HENT:      Head: Normocephalic and atraumatic.      Right Ear: Tympanic membrane normal.      Left Ear: Tympanic membrane normal.      Nose: Nose normal.      Mouth/Throat:      Mouth: Mucous membranes are moist.      Pharynx: Oropharynx is clear.   Eyes:      Extraocular Movements: Extraocular movements intact.      Conjunctiva/sclera: Conjunctivae normal.      Pupils: Pupils are equal, round, and reactive to light.   Cardiovascular:      Rate and Rhythm: Normal rate and regular rhythm.      Heart sounds: No murmur heard.  Pulmonary:      Effort: Pulmonary effort is normal. No respiratory distress.      Breath sounds: Normal breath sounds.   Abdominal:      Palpations: Abdomen is soft.      Tenderness: There is no abdominal tenderness.   Musculoskeletal:         General: No swelling.      Cervical back: Neck supple.      Comments: Exam of the right foot and ankle shows no significant  swelling bruising or deformity.  No tenderness over the ATFL.  Full range of motion of the ankle.  She does have pain with dorsiflexion at the posterior heel.  She is directly tender over the Achilles tendon.  No palpable defect.  Good strength.  Compartments are soft.  Good distal pulses.  Neurovascularly intact.   Skin:     General: Skin is warm and dry.      Capillary Refill: Capillary refill takes less than 2 seconds.   Neurological:      General: No focal deficit present.      Mental Status: She is alert.   Psychiatric:         Mood and Affect: Mood normal.        VS: As documented in the triage note and EMR flowsheet from this visit were reviewed.    Medical Decision Making: This is a 23-year-old female presenting to the ED for chief complaint of right ankle pain.  She denies injury.  Her vitals are reassuring.  Her pain is localized to the posterior right heel just over the Achilles.  No palpable defects.  Achilles is intact.  All tendons are intact and the ankle is otherwise stable.  She is neurovascular intact with good distal pulses.  I have a low suspicion for fracture as there was no injury and her pain is directly reproducible over the Achilles tendon.  Concern for Achilles tendinopathy versus tendinitis.  Patient was treated with an Ace bandage.  She was offered crutches but declined.  She is appropriate for outpatient management.  Follow-up with orthopedics.  Understands return precautions.  Diagnoses as of 10/01/24 1131   Achilles tendinitis of right lower extremity     Counseling: Spoke with the patient and discussed today´s findings, in addition to providing specific details for the plan of care and expected course.  Patient was given the opportunity to ask questions.    Discussed return precautions and importance of follow-up.  Advised to follow-up with orthopedics.  I specifically advised to return to the ED for changing or worsening symptoms, new symptoms, complaint specific precautions, and  precautions listed on the discharge paperwork.  Educated on the common potential side effects of medications prescribed.    I advised the patient that the emergency evaluation and treatment provided today doesn't end their need for medical care. It is very important that they follow-up with their primary care provider or other specialist as instructed.    The plan of care was mutually agreed upon with the patient. The patient and/or family were given the opportunity to ask questions. All questions asked today in the ED were answered to the best of my ability with today's information.    This patient was cared for in the setting of nationwide stress on resources and staffing.    This report was transcribed using voice recognition software.  Every effort was made to ensure accuracy, however, inadvertently computerized transcription errors may be present.       Manny Carter PA-C  10/01/24 1130

## 2024-10-01 NOTE — Clinical Note
Nguyen Nichols was seen and treated in our emergency department on 10/1/2024.  She may return to work on 10/03/2024.       If you have any questions or concerns, please don't hesitate to call.      Manny Carter PA-C

## 2024-10-01 NOTE — ED TRIAGE NOTES
Patient presents to the ED for right ankle pain that started when she woke up this morning. Patient states she is a  and on her feet all day. Denies injury.

## 2025-02-18 ENCOUNTER — APPOINTMENT (OUTPATIENT)
Dept: OBSTETRICS AND GYNECOLOGY | Facility: CLINIC | Age: 25
End: 2025-02-18
Payer: COMMERCIAL

## 2025-02-22 ENCOUNTER — HOSPITAL ENCOUNTER (EMERGENCY)
Facility: HOSPITAL | Age: 25
Discharge: HOME | End: 2025-02-23
Attending: STUDENT IN AN ORGANIZED HEALTH CARE EDUCATION/TRAINING PROGRAM
Payer: COMMERCIAL

## 2025-02-22 VITALS
HEART RATE: 64 BPM | BODY MASS INDEX: 36.5 KG/M2 | TEMPERATURE: 98.8 F | HEIGHT: 63 IN | DIASTOLIC BLOOD PRESSURE: 90 MMHG | OXYGEN SATURATION: 99 % | SYSTOLIC BLOOD PRESSURE: 150 MMHG | WEIGHT: 206 LBS | RESPIRATION RATE: 18 BRPM

## 2025-02-22 DIAGNOSIS — O21.0 HYPEREMESIS GRAVIDARUM (HHS-HCC): Primary | ICD-10-CM

## 2025-02-22 PROCEDURE — 99284 EMERGENCY DEPT VISIT MOD MDM: CPT | Performed by: STUDENT IN AN ORGANIZED HEALTH CARE EDUCATION/TRAINING PROGRAM

## 2025-02-22 ASSESSMENT — LIFESTYLE VARIABLES
EVER FELT BAD OR GUILTY ABOUT YOUR DRINKING: NO
TOTAL SCORE: 0
EVER HAD A DRINK FIRST THING IN THE MORNING TO STEADY YOUR NERVES TO GET RID OF A HANGOVER: NO
HAVE PEOPLE ANNOYED YOU BY CRITICIZING YOUR DRINKING: NO
HAVE YOU EVER FELT YOU SHOULD CUT DOWN ON YOUR DRINKING: NO

## 2025-02-22 ASSESSMENT — PAIN - FUNCTIONAL ASSESSMENT: PAIN_FUNCTIONAL_ASSESSMENT: 0-10

## 2025-02-22 ASSESSMENT — PAIN SCALES - GENERAL: PAINLEVEL_OUTOF10: 0 - NO PAIN

## 2025-02-23 LAB
ANION GAP SERPL CALC-SCNC: 19 MMOL/L (ref 10–20)
APPEARANCE UR: ABNORMAL
BILIRUB UR STRIP.AUTO-MCNC: ABNORMAL MG/DL
BUN SERPL-MCNC: 9 MG/DL (ref 6–23)
CALCIUM SERPL-MCNC: 10 MG/DL (ref 8.6–10.6)
CHLORIDE SERPL-SCNC: 101 MMOL/L (ref 98–107)
CO2 SERPL-SCNC: 21 MMOL/L (ref 21–32)
COLOR UR: YELLOW
CREAT SERPL-MCNC: 0.58 MG/DL (ref 0.5–1.05)
EGFRCR SERPLBLD CKD-EPI 2021: >90 ML/MIN/1.73M*2
GLUCOSE SERPL-MCNC: 88 MG/DL (ref 74–99)
GLUCOSE UR STRIP.AUTO-MCNC: NORMAL MG/DL
HIV 1+2 AB+HIV1 P24 AG SERPL QL IA: NONREACTIVE
KETONES UR STRIP.AUTO-MCNC: ABNORMAL MG/DL
LEUKOCYTE ESTERASE UR QL STRIP.AUTO: ABNORMAL
MUCOUS THREADS #/AREA URNS AUTO: ABNORMAL /LPF
NITRITE UR QL STRIP.AUTO: NEGATIVE
PH UR STRIP.AUTO: 6 [PH]
POTASSIUM SERPL-SCNC: 3.9 MMOL/L (ref 3.5–5.3)
PROT UR STRIP.AUTO-MCNC: ABNORMAL MG/DL
RBC # UR STRIP.AUTO: NEGATIVE MG/DL
RBC #/AREA URNS AUTO: ABNORMAL /HPF
SODIUM SERPL-SCNC: 137 MMOL/L (ref 136–145)
SP GR UR STRIP.AUTO: 1.04
SQUAMOUS #/AREA URNS AUTO: ABNORMAL /HPF
UROBILINOGEN UR STRIP.AUTO-MCNC: ABNORMAL MG/DL
WBC #/AREA URNS AUTO: ABNORMAL /HPF

## 2025-02-23 PROCEDURE — 96365 THER/PROPH/DIAG IV INF INIT: CPT

## 2025-02-23 PROCEDURE — 96375 TX/PRO/DX INJ NEW DRUG ADDON: CPT

## 2025-02-23 PROCEDURE — 81001 URINALYSIS AUTO W/SCOPE: CPT

## 2025-02-23 PROCEDURE — 36415 COLL VENOUS BLD VENIPUNCTURE: CPT

## 2025-02-23 PROCEDURE — 96361 HYDRATE IV INFUSION ADD-ON: CPT

## 2025-02-23 PROCEDURE — 80051 ELECTROLYTE PANEL: CPT

## 2025-02-23 PROCEDURE — 87389 HIV-1 AG W/HIV-1&-2 AB AG IA: CPT

## 2025-02-23 PROCEDURE — 2500000004 HC RX 250 GENERAL PHARMACY W/ HCPCS (ALT 636 FOR OP/ED): Mod: SE

## 2025-02-23 RX ORDER — METOCLOPRAMIDE HYDROCHLORIDE 5 MG/ML
10 INJECTION INTRAMUSCULAR; INTRAVENOUS ONCE
Status: COMPLETED | OUTPATIENT
Start: 2025-02-23 | End: 2025-02-23

## 2025-02-23 RX ORDER — ONDANSETRON 4 MG/1
4 TABLET, FILM COATED ORAL EVERY 6 HOURS
Qty: 12 TABLET | Refills: 0 | Status: SHIPPED | OUTPATIENT
Start: 2025-02-23 | End: 2025-02-26

## 2025-02-23 RX ADMIN — SODIUM CHLORIDE, SODIUM LACTATE, POTASSIUM CHLORIDE, AND CALCIUM CHLORIDE 1000 ML: 600; 310; 30; 20 INJECTION, SOLUTION INTRAVENOUS at 00:46

## 2025-02-23 RX ADMIN — METOCLOPRAMIDE 10 MG: 5 INJECTION, SOLUTION INTRAMUSCULAR; INTRAVENOUS at 00:46

## 2025-02-23 RX ADMIN — PYRIDOXINE HYDROCHLORIDE 10 MG: 100 INJECTION, SOLUTION INTRAMUSCULAR; INTRAVENOUS at 02:42

## 2025-02-23 NOTE — ED PROVIDER NOTES
HPI   Chief Complaint   Patient presents with   • Vomiting   • Nausea       HPI  Patient is a 23-year-old -0-3-1 presenting with severe nausea and vomiting.  Patient said for the past 2 days he has been vomiting.  Patient usually vomits in her first trimester of pregnancy.  Patient denies any fever, and chills.  Patient also denies vaginal bleeding and discharge.  Patient denies any abdominal pain.      Patient History   Past Medical History:   Diagnosis Date   • Abnormal tooth eruption 2016   • Encounter for supervision of normal first pregnancy, third trimester 2018    First pregnancy, third trimester   • Inadequate pain control 2016   • Obesity 2015   • Pruritus, unspecified 10/26/2018    Itchy skin   • Trichomonal vaginitis 2017     History reviewed. No pertinent surgical history.  No family history on file.  Social History     Tobacco Use   • Smoking status: Not on file   • Smokeless tobacco: Not on file   Substance Use Topics   • Alcohol use: Not on file   • Drug use: Not on file       Physical Exam   ED Triage Vitals [25 2342]   Temperature Heart Rate Respirations BP   37.1 °C (98.8 °F) 64 18 150/90      Pulse Ox Temp Source Heart Rate Source Patient Position   99 % Oral Monitor --      BP Location FiO2 (%)     -- --       Physical Exam  Constitutional:       Appearance: Normal appearance.   HENT:      Head: Normocephalic and atraumatic.   Cardiovascular:      Rate and Rhythm: Normal rate and regular rhythm.      Pulses: Normal pulses.      Heart sounds: Normal heart sounds.   Pulmonary:      Effort: Pulmonary effort is normal.      Breath sounds: Normal breath sounds.   Abdominal:      General: Abdomen is flat. Bowel sounds are normal.      Palpations: Abdomen is soft.   Skin:     Capillary Refill: Capillary refill takes less than 2 seconds.   Neurological:      General: No focal deficit present.      Mental Status: She is alert and oriented to person, place, and  time. Mental status is at baseline.           ED Course & MDM   Diagnoses as of 25   Hyperemesis gravidarum ([facility])                 No data recorded     [place] Coma Scale Score: 15 (25 0029 : Zahira Nava RN)                           Medical Decision Making  Patient is a 23-year-old -0-3-1 presenting with severe nausea and vomiting.  At bedside patient was hemodynamically stable but was vomiting.  Patient was given Reglan, B6 and fluid.  Patient BMP was unremarkable.  There was no sign of electrolyte imbalance.  Patient is most likely endorsing hyperemesis gravidarum.  After reassessment patient symptoms has subsided.  Patient was then discharged home.    Procedure  Procedures     Surendra Harris MD  Resident  25

## 2025-02-23 NOTE — ED TRIAGE NOTES
Patient presetned to ED from home for N/V with bloody emesis x 3 days. Patient states she recently found out she is 7weeks gestation.  . Denying any pain. Endorsing latex allergy with NKDA. A&Ox4